# Patient Record
Sex: MALE | Race: WHITE | Employment: UNEMPLOYED | URBAN - METROPOLITAN AREA
[De-identification: names, ages, dates, MRNs, and addresses within clinical notes are randomized per-mention and may not be internally consistent; named-entity substitution may affect disease eponyms.]

---

## 2017-11-06 ENCOUNTER — HOSPITAL ENCOUNTER (EMERGENCY)
Facility: HOSPITAL | Age: 42
Discharge: HOME | End: 2017-11-06
Attending: EMERGENCY MEDICINE

## 2017-11-06 VITALS
HEIGHT: 70 IN | TEMPERATURE: 98 F | HEART RATE: 72 BPM | OXYGEN SATURATION: 98 % | SYSTOLIC BLOOD PRESSURE: 110 MMHG | WEIGHT: 221 LBS | DIASTOLIC BLOOD PRESSURE: 47 MMHG | RESPIRATION RATE: 16 BRPM | BODY MASS INDEX: 31.64 KG/M2

## 2017-11-06 DIAGNOSIS — N28.9 RENAL INSUFFICIENCY: ICD-10-CM

## 2017-11-06 DIAGNOSIS — T40.601A NARCOTIC OVERDOSE, ACCIDENTAL OR UNINTENTIONAL, INITIAL ENCOUNTER (HCC): Primary | ICD-10-CM

## 2017-11-06 PROCEDURE — 80320 DRUG SCREEN QUANTALCOHOLS: CPT | Performed by: EMERGENCY MEDICINE

## 2017-11-06 PROCEDURE — 96361 HYDRATE IV INFUSION ADD-ON: CPT

## 2017-11-06 PROCEDURE — 96360 HYDRATION IV INFUSION INIT: CPT

## 2017-11-06 PROCEDURE — 99285 EMERGENCY DEPT VISIT HI MDM: CPT

## 2017-11-06 PROCEDURE — 81001 URINALYSIS AUTO W/SCOPE: CPT | Performed by: EMERGENCY MEDICINE

## 2017-11-06 PROCEDURE — 85025 COMPLETE CBC W/AUTO DIFF WBC: CPT | Performed by: EMERGENCY MEDICINE

## 2017-11-06 PROCEDURE — 87086 URINE CULTURE/COLONY COUNT: CPT | Performed by: EMERGENCY MEDICINE

## 2017-11-06 PROCEDURE — 80307 DRUG TEST PRSMV CHEM ANLYZR: CPT | Performed by: EMERGENCY MEDICINE

## 2017-11-06 PROCEDURE — 80053 COMPREHEN METABOLIC PANEL: CPT | Performed by: EMERGENCY MEDICINE

## 2017-11-06 RX ORDER — SODIUM CHLORIDE 9 MG/ML
INJECTION, SOLUTION INTRAVENOUS ONCE
Status: COMPLETED | OUTPATIENT
Start: 2017-11-06 | End: 2017-11-06

## 2017-11-06 NOTE — ED NOTES
Attempting to see if patient can be discharge he is calling for a ride I will see if he can ambulate

## 2017-11-06 NOTE — ED INITIAL ASSESSMENT (HPI)
Found unresponsive aganol respiration  Family called and found him unresponsive around 36 medics arrived he had aganol breathing responding to painful stimuli;    Patient was snorting heroin for sure. Heath Olsen   He received narcan in the field 1mg  IO right tib  H

## 2017-11-06 NOTE — ED NOTES
I attempted to locate his friend unsuccessful as well as call the  home in [place] [id number] unable to locate anyone he does not have his wallet cash or phone    Calling a cab he said he will pay when he arrives home I will sign the voucher

## 2017-11-06 NOTE — ED PROVIDER NOTES
Patient Seen in: [facility] Emergency Department    History   Patient presents with:  Eval-P (psychiatric)    Stated Complaint: OD    HPI    41-year-old male complaining of a heroin overdose.   The patient admits to snorting heroin a few hours ago fa the following:        Result Value    Glucose 133 (*)     Creatinine 1.74 (*)     GFR 47 (*)     AST 80 (*)     Alt 64 (*)     Sodium 135 (*)     Chloride 98 (*)     All other components within normal limits   DRUG SCREEN 7 W/OUT CONFIRMATION, URINE - Abno discharge.   He did not want any help for substance abuse or any detox he was not suicidal homicidal      Disposition and Plan     Clinical Impression:  Narcotic overdose, accidental or unintentional, initial encounter  (primary encounter diagnosis)  Renal

## (undated) NOTE — ED AVS SNAPSHOT
Mr. Josiane Carpenter   MRN: [id number]    Department:  [facility] Emergency Department   Date of Visit:  11/6/2017           Disclosure     Insurance plans vary and the physician(s) referred by the ER may not be covered by your plan.  Please conta If you have been prescribed any medication(s), please fill your prescription right away and begin taking the medication(s) as directed    If the emergency physician has read X-rays, these will be re-interpreted by a radiologist.  If there is a significant